# Patient Record
Sex: MALE | Race: BLACK OR AFRICAN AMERICAN
[De-identification: names, ages, dates, MRNs, and addresses within clinical notes are randomized per-mention and may not be internally consistent; named-entity substitution may affect disease eponyms.]

---

## 2023-08-04 ENCOUNTER — HOSPITAL ENCOUNTER (OUTPATIENT)
Dept: HOSPITAL 47 - ORWHC2ENDO | Age: 48
Discharge: HOME | End: 2023-08-04
Attending: INTERNAL MEDICINE
Payer: COMMERCIAL

## 2023-08-04 VITALS — DIASTOLIC BLOOD PRESSURE: 65 MMHG | SYSTOLIC BLOOD PRESSURE: 125 MMHG | HEART RATE: 72 BPM

## 2023-08-04 VITALS — BODY MASS INDEX: 30.2 KG/M2

## 2023-08-04 VITALS — TEMPERATURE: 97.5 F | RESPIRATION RATE: 18 BRPM

## 2023-08-04 DIAGNOSIS — Z12.11: Primary | ICD-10-CM

## 2023-08-04 DIAGNOSIS — D86.9: ICD-10-CM

## 2023-08-04 DIAGNOSIS — D12.0: ICD-10-CM

## 2023-08-04 DIAGNOSIS — F12.10: ICD-10-CM

## 2023-08-04 DIAGNOSIS — Z79.899: ICD-10-CM

## 2023-08-04 DIAGNOSIS — D12.3: ICD-10-CM

## 2023-08-04 PROCEDURE — 45385 COLONOSCOPY W/LESION REMOVAL: CPT

## 2023-08-04 PROCEDURE — 88305 TISSUE EXAM BY PATHOLOGIST: CPT

## 2023-08-04 NOTE — P.PCN
Date of Procedure: 08/04/23


Procedure(s) Performed: 


BRIEF HISTORY: Patient is a 48-year-old pleasant -[place] male scheduled

for an elective colonoscopy as a part of screening forr colon cancer.





PROCEDURE PERFORMED: Colonoscopy with snare polypectomy . 





PREOPERATIVE DIAGNOSIS: Screening for colon cancer. 





IV sedation per Anesthesia. 





PROCEDURE: After informed consent was obtained, the patient, was brought into 

the endoscopy unit. IV sedation was administered by Anesthesia under continuous 

monitoring.  Digital rectal examination was normal. Initially the Olympus CF-160

flexible video colonoscope was then inserted in the rectum, gradually advanced 

into the cecum without any difficulty. Careful examination was performed as the 

scope was gradually being withdrawn. Ileocecal valve and the appendiceal orifice

were visualized and appeared normal.  Prep was excellent. Mucosa of the cecum 

had 3 mm polyp that was removed by snare polypectomy.  In the hepatic flexure 

there was another 3 mm and 7 mm polyp removed by snare polypectomy.  Rest of the

 ascending colon, transverse colon, descending colon, sigmoid colon, and rectum 

appeared normal. Retroflexion was performed in the rectum and no lesions were 

seen. The patient tolerated the procedure well. 





IMPRESSION: 


3 mm cecal polyp status post polypectomy 


 3 mm and 7 mm hepatic flexure polyp status post polypectomy


Rest of the colon appeared normal





RECOMMENDATIONS:  Findings of this examination were discussed with the patient 

as well as his family.  He was advised toto follow with the biopsy results.  If 

the biopsy results adenoma he can have a repeat colonoscopy in 5 years

## 2025-06-06 ENCOUNTER — HOSPITAL ENCOUNTER (EMERGENCY)
Dept: HOSPITAL 47 - EC | Age: 50
End: 2025-06-06
Payer: COMMERCIAL

## 2025-06-06 VITALS
DIASTOLIC BLOOD PRESSURE: 82 MMHG | TEMPERATURE: 97.4 F | SYSTOLIC BLOOD PRESSURE: 153 MMHG | HEART RATE: 93 BPM | RESPIRATION RATE: 16 BRPM

## 2025-06-06 DIAGNOSIS — Z02.83: Primary | ICD-10-CM

## 2025-06-06 PROCEDURE — 99499 UNLISTED E&M SERVICE: CPT
